# Patient Record
Sex: FEMALE | Race: WHITE | NOT HISPANIC OR LATINO | Employment: OTHER | URBAN - METROPOLITAN AREA
[De-identification: names, ages, dates, MRNs, and addresses within clinical notes are randomized per-mention and may not be internally consistent; named-entity substitution may affect disease eponyms.]

---

## 2017-04-05 ENCOUNTER — TELEPHONE (OUTPATIENT)
Dept: FAMILY MEDICINE | Facility: CLINIC | Age: 57
End: 2017-04-05

## 2017-04-05 NOTE — TELEPHONE ENCOUNTER
Panel Management Review      Patient has the following on her problem list: None      Composite cancer screening  Chart review shows that this patient is due/due soon for the following Pap Smear, Mammogram and Colonoscopy  Summary:    Patient is due/failing the following:   COLONOSCOPY, MAMMOGRAM and PAP    Action needed:   Patient needs office visit for a physical with fasting labs.    Type of outreach:    Sent letter.    Questions for provider review:    None                                                                                                                                    Tammie Diaz LPN       Chart routed to Care Team .

## 2017-04-05 NOTE — LETTER
[facility]     [address] [place]  [address]    Phone:  [phone]          Dilcia Wen                                                                [address] [facility] [address]              April 5, 2017             Our records indicate that you have not scheduled for a(n)mammogram, colonoscopy and annual female exam which was recommended by your health care team. Monitoring and managing your preventative and chronic health conditions are very important to us.     You are also due for a fasting lab appointment one week prior to the appointment with the provider.  Nothing to eat or drink, except water 10-12 hours prior to the appointment.    If you are receiving any of these services at another site please bring in a copy at your next visit so we can get it scanned into your chart.       Please call [phone] or message us through your [employer] account to schedule an appointment or provide information for your chart.     I look forward to seeing you and working with you on your health care needs.     Sincerely,       Babak Sanz MD/maurice              *If you have already scheduled an appointment, please disregard this reminder

## 2017-07-06 ENCOUNTER — TELEPHONE (OUTPATIENT)
Dept: FAMILY MEDICINE | Facility: CLINIC | Age: 57
End: 2017-07-06

## 2018-01-07 ENCOUNTER — HEALTH MAINTENANCE LETTER (OUTPATIENT)
Age: 58
End: 2018-01-07

## 2019-06-19 ENCOUNTER — OFFICE VISIT (OUTPATIENT)
Dept: FAMILY MEDICINE | Facility: CLINIC | Age: 59
End: 2019-06-19
Payer: COMMERCIAL

## 2019-06-19 VITALS
OXYGEN SATURATION: 97 % | DIASTOLIC BLOOD PRESSURE: 75 MMHG | HEART RATE: 93 BPM | BODY MASS INDEX: 23.89 KG/M2 | SYSTOLIC BLOOD PRESSURE: 123 MMHG | TEMPERATURE: 98.4 F | WEIGHT: 130.6 LBS | RESPIRATION RATE: 14 BRPM

## 2019-06-19 DIAGNOSIS — Z13.29 THYROID DISORDER SCREEN: Primary | ICD-10-CM

## 2019-06-19 DIAGNOSIS — Z12.31 ENCOUNTER FOR SCREENING MAMMOGRAM FOR BREAST CANCER: ICD-10-CM

## 2019-06-19 DIAGNOSIS — Z91.89 HISTORY OF WEIGHT CHANGE: ICD-10-CM

## 2019-06-19 DIAGNOSIS — L85.3 DRY SKIN: ICD-10-CM

## 2019-06-19 LAB
ALBUMIN SERPL-MCNC: 4.5 G/DL (ref 3.4–5)
ALP SERPL-CCNC: 66 U/L (ref 40–150)
ALT SERPL W P-5'-P-CCNC: 25 U/L (ref 0–50)
ANION GAP SERPL CALCULATED.3IONS-SCNC: 8 MMOL/L (ref 3–14)
AST SERPL W P-5'-P-CCNC: 17 U/L (ref 0–45)
BILIRUB SERPL-MCNC: 0.8 MG/DL (ref 0.2–1.3)
BUN SERPL-MCNC: 10 MG/DL (ref 7–30)
CALCIUM SERPL-MCNC: 8.2 MG/DL (ref 8.5–10.1)
CHLORIDE SERPL-SCNC: 100 MMOL/L (ref 94–109)
CO2 SERPL-SCNC: 26 MMOL/L (ref 20–32)
CREAT SERPL-MCNC: 0.8 MG/DL (ref 0.52–1.04)
GFR SERPL CREATININE-BSD FRML MDRD: 81 ML/MIN/{1.73_M2}
GLUCOSE SERPL-MCNC: 89 MG/DL (ref 70–99)
POTASSIUM SERPL-SCNC: 4.2 MMOL/L (ref 3.4–5.3)
PROT SERPL-MCNC: 7.1 G/DL (ref 6.8–8.8)
SODIUM SERPL-SCNC: 134 MMOL/L (ref 133–144)
TSH SERPL DL<=0.005 MIU/L-ACNC: 3.3 MU/L (ref 0.4–4)

## 2019-06-19 PROCEDURE — 80053 COMPREHEN METABOLIC PANEL: CPT | Performed by: PHYSICIAN ASSISTANT

## 2019-06-19 PROCEDURE — 99213 OFFICE O/P EST LOW 20 MIN: CPT | Performed by: PHYSICIAN ASSISTANT

## 2019-06-19 PROCEDURE — 84443 ASSAY THYROID STIM HORMONE: CPT | Performed by: PHYSICIAN ASSISTANT

## 2019-06-19 PROCEDURE — 36415 COLL VENOUS BLD VENIPUNCTURE: CPT | Performed by: PHYSICIAN ASSISTANT

## 2019-06-19 NOTE — PROGRESS NOTES
Subjective     Dilcia Carver is a 58 year old female who presents to clinic today for the following health issues:    HPI   Pt here to discuss possible thyroid issues - has noticed dry skin, hair thinning, weight gain - doesn't change if she diets, muscle aches - maily noticeable when going up stairs. daughters with thyroid issues and encouraged her to come in as they has similar symptoms.    Noticed this gradually over the last couple years.       Patient Active Problem List   Diagnosis     Advanced directives, counseling/discussion     Past Surgical History:   Procedure Laterality Date     ORTHOPEDIC SURGERY  2012    right foot bunion       Social History     Tobacco Use     Smoking status: Never Smoker     Smokeless tobacco: Never Used   Substance Use Topics     Alcohol use: Yes     Comment: occ     History reviewed. No pertinent family history.      Current Outpatient Medications   Medication Sig Dispense Refill     COENZYME Q-10 PO        Famotidine (PEPCID PO)        Omega-3 Fatty Acids (OMEGA-3 FISH OIL PO)        No Known Allergies  BP Readings from Last 3 Encounters:   06/19/19 123/75   10/31/16 137/75   06/17/16 116/67    Wt Readings from Last 3 Encounters:   06/19/19 59.2 kg (130 lb 9.6 oz)   10/31/16 55.8 kg (123 lb)   06/17/16 54.4 kg (120 lb)         Reviewed and updated as needed this visit by Provider  Tobacco  Allergies  Meds  Problems  Med Hx  Surg Hx  Fam Hx       Review of Systems   ROS COMP: Constitutional, HEENT, cardiovascular, pulmonary, gi and gu systems are negative, except as otherwise noted.      Objective    /75   Pulse 93   Temp 98.4  F (36.9  C) (Oral)   Resp 14   Wt 59.2 kg (130 lb 9.6 oz)   SpO2 97%   BMI 23.89 kg/m    Body mass index is 23.89 kg/m .  Physical Exam   GENERAL: healthy, alert and no distress  Head: Normocephalic, atraumatic.  Eyes: Conjunctiva clear, non icteric. PERRLA.  Ears: External ears and TMs normal BL.  Nose: Septum midline, nasal mucosa  pink and moist. No discharge.  Mouth / Throat: Normal dentition.  No oral lesions. Pharynx non erythematous, tonsils without hypertrophy.  Neck: Supple, no enlarged LN, trachea midline.  Heart: S1 and S2 normal, no murmurs, clicks, gallops or rubs. Regular rate and rhythm.  Chest: Clear; no wheezes or rales.  The abdomen is soft without tenderness, guarding, mass, rebound or organomegaly. Bowel sounds are normal.        Diagnostic Test Results:  Labs reviewed in Epic  No results found for this or any previous visit (from the past 24 hour(s)).        Assessment & Plan       ICD-10-CM    1. Thyroid disorder screen Z13.29 TSH with free T4 reflex   2. History of weight change Z91.89 Comprehensive metabolic panel   3. Dry skin L85.3 Comprehensive metabolic panel   4. Encounter for screening mammogram for breast cancer Z12.31 MA SCREENING DIGITAL BILAT - Future  (s+30)   labs pending  warning signs discussed.  Follow up  Dependant on labs.     Return in about 1 month (around 7/19/2019) for As Needed.    Natanael Kamara PA-C  [facility]

## 2019-06-20 NOTE — RESULT ENCOUNTER NOTE
Ms. Carver,    All of your labs were normal for you.    Please contact the clinic if you have additional questions.  Thank you.    Sincerely,    Natanael Kamara PA-C

## 2019-08-21 ENCOUNTER — TELEPHONE (OUTPATIENT)
Dept: FAMILY MEDICINE | Facility: CLINIC | Age: 59
End: 2019-08-21

## 2019-08-21 NOTE — TELEPHONE ENCOUNTER
Noted 7/2/19 by imaging schedulers pt will call when ready and was removed from calling list. Did not place call. Comm pref is mail only. NX

## 2019-08-21 NOTE — TELEPHONE ENCOUNTER
This patient is due for routine mammogram, please contact patient to schedule.  Maricruz Dejesus,

## 2019-10-17 ENCOUNTER — TELEPHONE (OUTPATIENT)
Dept: FAMILY MEDICINE | Facility: CLINIC | Age: 59
End: 2019-10-17

## 2020-03-10 ENCOUNTER — HEALTH MAINTENANCE LETTER (OUTPATIENT)
Age: 60
End: 2020-03-10

## 2020-12-27 ENCOUNTER — HEALTH MAINTENANCE LETTER (OUTPATIENT)
Age: 60
End: 2020-12-27

## 2021-03-21 ENCOUNTER — OFFICE VISIT (OUTPATIENT)
Dept: URGENT CARE | Facility: URGENT CARE | Age: 61
End: 2021-03-21
Payer: COMMERCIAL

## 2021-03-21 VITALS
HEART RATE: 90 BPM | BODY MASS INDEX: 25.17 KG/M2 | SYSTOLIC BLOOD PRESSURE: 136 MMHG | OXYGEN SATURATION: 98 % | WEIGHT: 137.6 LBS | RESPIRATION RATE: 16 BRPM | TEMPERATURE: 99.1 F | DIASTOLIC BLOOD PRESSURE: 75 MMHG

## 2021-03-21 DIAGNOSIS — B35.0 TINEA CAPITIS: ICD-10-CM

## 2021-03-21 DIAGNOSIS — B35.4 TINEA CORPORIS: Primary | ICD-10-CM

## 2021-03-21 PROCEDURE — 99213 OFFICE O/P EST LOW 20 MIN: CPT | Performed by: FAMILY MEDICINE

## 2021-03-21 NOTE — PROGRESS NOTES
SUBJECTIVE:  Dilcia Carver is a 60 year old female who presents to the clinic today for a rash.  Onset of rash was 1 day(s) ago.   Rash is gradual onset.  Location of the rash: forehead at hairline.  Quality/symptoms of rash: itching   Symptoms are mild and rash seems to be worsening.  Previous history of a similar rash? No  Recent exposure history: none known    Associated symptoms include: nothing.    No past medical history on file.  Current Outpatient Medications   Medication Sig Dispense Refill     COENZYME Q-10 PO        Famotidine (PEPCID PO)        Omega-3 Fatty Acids (OMEGA-3 FISH OIL PO)        History   Smoking Status     Never Smoker   Smokeless Tobacco     Never Used       ROS:  Review of systems negative except as stated above.    EXAM:   /75 (BP Location: Right arm, Patient Position: Sitting, Cuff Size: Adult Regular)   Pulse 90   Temp 99.1  F (37.3  C) (Tympanic)   Resp 16   Wt 62.4 kg (137 lb 9.6 oz)   SpO2 98%   BMI 25.17 kg/m    GENERAL: alert, no acute distress.  SKIN: Rash description:    Flat pink area with irregular border approx 2cm in diameter at hairline mid  Forehead.  No vesicles and not tender.  No other head pain or tenderness.    ASSESSMENT:  Tinea capitus    PLAN:  Use OTC antifungal tid.  If no improvement in 3days start hydrocortisone cream tid.  Follow up with primary care provider if no improvement.

## 2021-03-21 NOTE — PATIENT INSTRUCTIONS
Use OTC anti fungal three times daily for 3 days.      If no improvement start hydrocortisone cream three times daily.

## 2021-04-24 ENCOUNTER — HEALTH MAINTENANCE LETTER (OUTPATIENT)
Age: 61
End: 2021-04-24

## 2021-10-09 ENCOUNTER — HEALTH MAINTENANCE LETTER (OUTPATIENT)
Age: 61
End: 2021-10-09

## 2022-03-20 ENCOUNTER — HEALTH MAINTENANCE LETTER (OUTPATIENT)
Age: 62
End: 2022-03-20

## 2022-05-16 ENCOUNTER — HEALTH MAINTENANCE LETTER (OUTPATIENT)
Age: 62
End: 2022-05-16

## 2022-09-11 ENCOUNTER — HEALTH MAINTENANCE LETTER (OUTPATIENT)
Age: 62
End: 2022-09-11

## 2023-04-15 ENCOUNTER — OFFICE VISIT (OUTPATIENT)
Dept: URGENT CARE | Facility: URGENT CARE | Age: 63
End: 2023-04-15
Payer: COMMERCIAL

## 2023-04-15 VITALS
RESPIRATION RATE: 18 BRPM | WEIGHT: 130 LBS | TEMPERATURE: 97.9 F | SYSTOLIC BLOOD PRESSURE: 159 MMHG | OXYGEN SATURATION: 98 % | BODY MASS INDEX: 23.78 KG/M2 | HEART RATE: 81 BPM | DIASTOLIC BLOOD PRESSURE: 80 MMHG

## 2023-04-15 DIAGNOSIS — R25.3 MUSCLE TWITCHING: ICD-10-CM

## 2023-04-15 DIAGNOSIS — R03.0 ELEVATED BLOOD PRESSURE READING WITHOUT DIAGNOSIS OF HYPERTENSION: Primary | ICD-10-CM

## 2023-04-15 PROCEDURE — 99214 OFFICE O/P EST MOD 30 MIN: CPT | Performed by: FAMILY MEDICINE

## 2023-04-15 ASSESSMENT — PAIN SCALES - GENERAL: PAINLEVEL: NO PAIN (0)

## 2023-04-15 NOTE — PROGRESS NOTES
Assessment & Plan     Elevated blood pressure reading without diagnosis of hypertension  Muscle twitching  62-year-old female presented with headache, nausea, shaking, muscle twitching and elevated home blood pressure.  Blood pressure this morning was 190/79.  Physical examination remarkable for elevated blood pressure and anxious appearing otherwise normal.  Differentials discussed in detail including but not limited to metabolic, cardiovascular and psychological etiology.  Needs comprehensive evaluation to delineate a specific diagnosis.  Recommended to go ER for further evaluation and management.  Patient, spouse understood and in agreement with above plan.  All questions answered.      Tyrel Acevedo MD  [facility]   Lyudmila is a 62 year old, presenting for the following health issues:  Hypertension (Since yesterday, happens off and on, after eating and resting bp gets better, muscles are twitching, headache, nausea )         View : No data to display.              HPI     Concern -  Onset: On and off for quite some time  Description: nausea, headache, shaking, twitching muscles and elevated blood pressure  Intensity: moderate  Progression of Symptoms:  waxing and waning  Accompanying Signs & Symptoms: No chest pain, shortness of breath, diarrhea, vomiting, urinary difficulty, limb weakness or other relevant systemic symptoms      Review of Systems   Constitutional, HEENT, cardiovascular, pulmonary, GI, , musculoskeletal, neuro, skin, endocrine and psych systems are negative, except as otherwise noted.      Objective    BP (!) 159/80   Pulse 81   Temp 97.9  F (36.6  C) (Tympanic)   Resp 18   Wt 59 kg (130 lb)   SpO2 98%   BMI 23.78 kg/m    Body mass index is 23.78 kg/m .  Physical Exam   GENERAL: alert and no distress  EYES: Eyes grossly normal to inspection, PERRL and conjunctivae and sclerae normal  HENT: normal cephalic/atraumatic, nose and mouth without  ulcers or lesions, oropharynx clear and oral mucous membranes moist  NECK: no adenopathy, no asymmetry, masses, or scars and thyroid normal to palpation  RESP: lungs clear to auscultation - no rales, rhonchi or wheezes  CV: regular rates and rhythm, normal S1 S2, no S3 or S4 and no murmur, click or rub  ABDOMEN: soft, nontender  MS: no gross musculoskeletal defects noted, no edema  SKIN: no suspicious lesions or rashes  NEURO: Normal strength and tone, mentation intact and speech normal  PSYCH: mentation appears normal, tearful, judgement and insight intact and appearance well groomed

## 2023-04-27 ENCOUNTER — OFFICE VISIT (OUTPATIENT)
Dept: FAMILY MEDICINE | Facility: CLINIC | Age: 63
End: 2023-04-27
Payer: COMMERCIAL

## 2023-04-27 VITALS
SYSTOLIC BLOOD PRESSURE: 120 MMHG | TEMPERATURE: 98.1 F | OXYGEN SATURATION: 98 % | BODY MASS INDEX: 23.5 KG/M2 | WEIGHT: 132.6 LBS | HEART RATE: 76 BPM | RESPIRATION RATE: 16 BRPM | HEIGHT: 63 IN | DIASTOLIC BLOOD PRESSURE: 74 MMHG

## 2023-04-27 DIAGNOSIS — E87.1 HYPONATREMIA: ICD-10-CM

## 2023-04-27 DIAGNOSIS — G44.209 TENSION HEADACHE: Primary | ICD-10-CM

## 2023-04-27 LAB
ANION GAP SERPL CALCULATED.3IONS-SCNC: 4 MMOL/L (ref 3–14)
BUN SERPL-MCNC: 13 MG/DL (ref 7–30)
CALCIUM SERPL-MCNC: 8.3 MG/DL (ref 8.5–10.1)
CHLORIDE BLD-SCNC: 107 MMOL/L (ref 94–109)
CO2 SERPL-SCNC: 29 MMOL/L (ref 20–32)
CREAT SERPL-MCNC: 0.72 MG/DL (ref 0.52–1.04)
GFR SERPL CREATININE-BSD FRML MDRD: >90 ML/MIN/1.73M2
GLUCOSE BLD-MCNC: 104 MG/DL (ref 70–99)
POTASSIUM BLD-SCNC: 4.6 MMOL/L (ref 3.4–5.3)
SODIUM SERPL-SCNC: 140 MMOL/L (ref 133–144)

## 2023-04-27 PROCEDURE — 80048 BASIC METABOLIC PNL TOTAL CA: CPT | Performed by: NURSE PRACTITIONER

## 2023-04-27 PROCEDURE — 36415 COLL VENOUS BLD VENIPUNCTURE: CPT | Performed by: NURSE PRACTITIONER

## 2023-04-27 PROCEDURE — 99213 OFFICE O/P EST LOW 20 MIN: CPT | Performed by: NURSE PRACTITIONER

## 2023-04-27 RX ORDER — SUMATRIPTAN 25 MG/1
25 TABLET, FILM COATED ORAL
Qty: 20 TABLET | Refills: 1 | Status: SHIPPED | OUTPATIENT
Start: 2023-04-27

## 2023-04-27 ASSESSMENT — ENCOUNTER SYMPTOMS
NERVOUS/ANXIOUS: 0
HEADACHES: 1
CHEST TIGHTNESS: 0
ABDOMINAL PAIN: 0
COUGH: 0
ARTHRALGIAS: 0
RHINORRHEA: 0
WHEEZING: 0
SHORTNESS OF BREATH: 0
PALPITATIONS: 0
NAUSEA: 1
DIZZINESS: 1
CONSTIPATION: 0
NUMBNESS: 0
MYALGIAS: 0
FATIGUE: 0
DIARRHEA: 0
DYSPHORIC MOOD: 0
SLEEP DISTURBANCE: 0
VOMITING: 0
SORE THROAT: 0
LIGHT-HEADEDNESS: 0
ABDOMINAL DISTENTION: 0

## 2023-04-27 ASSESSMENT — PAIN SCALES - GENERAL: PAINLEVEL: NO PAIN (0)

## 2023-04-27 NOTE — PATIENT INSTRUCTIONS
Check your blood pressure when you first start to feel the pressure in your eyes.     Plan to check blood pressure out in the community and notify if is consistently (50% of time) above 130/90.      Please schedule a preventative visit.

## 2023-04-27 NOTE — PROGRESS NOTES
Assessment & Plan     Tension headache  Previous emergency department notes and labs reviewed.  Encouraged to check blood pressure when first starts to feel pressure in head.  If blood pressure is more elevated at that time consider propanolol for abortive treatment.  Prescription given today for Imitrex.  Educated on use and possible side effects.  Education given regarding warning signs to watch for and when would need to seek immediate medical attention.  - SUMAtriptan (IMITREX) 25 MG tablet; Take 1 tablet (25 mg) by mouth at onset of headache for migraine May repeat in 2 hours. Max 8 tablets/24 hours.    Hyponatremia  Previous ED labs reviewed.  We will recheck here today.  - Basic metabolic panel; Future    Encouraged to schedule preventative visit.  Plans to schedule through [facility].    Review of external notes as documented elsewhere in note  Review of the result(s) of each unique test - Labs  Ordering of each unique test  Prescription drug management  25 minutes spent by me on the date of the encounter doing chart review, history and exam, documentation and further activities per the note     MED REC REQUIRED  Post Medication Reconciliation Status: discharge medications reconciled and changed, per note/orders      LOGAN Mendez CNP  [facility] CASANDRA Coreas is a 62 year old, presenting for the following health issues:  ER F/U        4/27/2023     9:02 AM   Additional Questions   Roomed by Carter HAM   Accompanied by DEYSI         4/27/2023     9:02 AM   Patient Reported Additional Medications   Patient reports taking the following new medications magnesium VitB, turmeric     HPI     ED/UC Followup:    Facility:  [facility] ER  Date of visit: 04/15/21  Reason for visit: Severe headache  Current Status: Improved      Thurs headache and then headache finally went away on Tues. Used to get headaches 1-2 per year. Then menopause and they stopped. Headaches start in  front, goes behind eyes and then to back of head. Oct, Nov, Dec and 2 in April. Will be very nausated with headaches. Feels like a lava lamp behind eyes. Takes dramamine for nausea. Is very sensitive to smells. Feels like there is someone inside banging on head. Tylenol or iburpfem do not dull it all. ASA does dull it a bit. Did hear a low frequency humm in head. No previous head trauma or concussions. Can tell when they are going to start. Prior to getting COVID will feel dizzy then they would come. Now after COVID gets pressure behind the eyes and more motion sickness. They are lasting longer and they are coming more frequently. Does have a blood pressure cuff at home and blood pressure was 190/88. Just checks blood pressure when has a headache. Will take ASA when first starts feeling it come on and sometimes that helps to prevent it from getting bad. Has been under more stress. Has been about the same as always. Drinks a good amount of water per day. Does drink caffeine.    Feels like mouth is burning and will not go away. If eats something too hot will be worse or if eats spicy food will get worse.        Review of Systems   Constitutional: Negative for fatigue.   HENT: Negative for ear pain, rhinorrhea and sore throat.         Sensitivity to smell with headaches   Eyes: Negative for visual disturbance.        Pressure behind eyes and vision changes with headaches   Respiratory: Negative for cough, chest tightness, shortness of breath and wheezing.    Cardiovascular: Negative for chest pain and palpitations.   Gastrointestinal: Positive for nausea (with headaches). Negative for abdominal distention, abdominal pain, constipation, diarrhea and vomiting.   Endocrine: Negative for cold intolerance and heat intolerance.   Musculoskeletal: Negative for arthralgias and myalgias.   Skin: Negative for rash.   Neurological: Positive for dizziness (with headaches) and headaches. Negative for light-headedness and numbness.  "  Psychiatric/Behavioral: Negative for dysphoric mood and sleep disturbance. The patient is not nervous/anxious.           Objective    /74   Pulse 76   Temp 98.1  F (36.7  C) (Temporal)   Resp 16   Ht 1.59 m (5' 2.6\")   Wt 60.1 kg (132 lb 9.6 oz)   SpO2 98%   BMI 23.79 kg/m    Body mass index is 23.79 kg/m .  Physical Exam  Constitutional:       Appearance: Normal appearance. She is well-developed.   HENT:      Head: Normocephalic and atraumatic.      Right Ear: Tympanic membrane and external ear normal. No middle ear effusion.      Left Ear: Tympanic membrane and external ear normal.  No middle ear effusion.      Nose: No mucosal edema.   Eyes:      Extraocular Movements: Extraocular movements intact.      Pupils: Pupils are equal, round, and reactive to light.   Neck:      Thyroid: No thyromegaly.      Vascular: No carotid bruit.   Cardiovascular:      Rate and Rhythm: Normal rate and regular rhythm.      Heart sounds: Normal heart sounds.   Pulmonary:      Effort: Pulmonary effort is normal.      Breath sounds: Normal breath sounds.   Abdominal:      General: Bowel sounds are normal.      Palpations: Abdomen is soft.   Skin:     General: Skin is warm and dry.   Neurological:      Mental Status: She is alert.      Motor: Motor function is intact.      Coordination: Coordination is intact.   Psychiatric:         Behavior: Behavior normal.                  "

## 2023-06-03 ENCOUNTER — HEALTH MAINTENANCE LETTER (OUTPATIENT)
Age: 63
End: 2023-06-03

## 2023-06-08 ENCOUNTER — TRANSFERRED RECORDS (OUTPATIENT)
Dept: HEALTH INFORMATION MANAGEMENT | Facility: CLINIC | Age: 63
End: 2023-06-08
Payer: COMMERCIAL

## 2023-06-21 ENCOUNTER — TRANSFERRED RECORDS (OUTPATIENT)
Dept: HEALTH INFORMATION MANAGEMENT | Facility: CLINIC | Age: 63
End: 2023-06-21
Payer: COMMERCIAL

## 2023-06-22 ENCOUNTER — TRANSFERRED RECORDS (OUTPATIENT)
Dept: HEALTH INFORMATION MANAGEMENT | Facility: CLINIC | Age: 63
End: 2023-06-22
Payer: COMMERCIAL

## 2023-07-13 ENCOUNTER — PATIENT OUTREACH (OUTPATIENT)
Dept: FAMILY MEDICINE | Facility: CLINIC | Age: 63
End: 2023-07-13
Payer: COMMERCIAL

## 2023-07-13 NOTE — TELEPHONE ENCOUNTER
Patient Quality Outreach    Patient is due for the following:   Colon Cancer Screening  Breast Cancer Screening - Mammogram  Cervical Cancer Screening - PAP Needed  Physical Preventive Adult Physical    Next Steps:   Schedule a Adult Preventative    Type of outreach:    Sent [employer] message.      Questions for provider review:    None           Tanika Hernandez, FATOUMATA  Chart routed to Care Team.

## 2024-05-04 ENCOUNTER — HEALTH MAINTENANCE LETTER (OUTPATIENT)
Age: 64
End: 2024-05-04

## 2024-07-13 ENCOUNTER — HEALTH MAINTENANCE LETTER (OUTPATIENT)
Age: 64
End: 2024-07-13

## 2024-12-30 ENCOUNTER — OFFICE VISIT (OUTPATIENT)
Dept: FAMILY MEDICINE | Facility: CLINIC | Age: 64
End: 2024-12-30
Payer: COMMERCIAL

## 2024-12-30 VITALS
DIASTOLIC BLOOD PRESSURE: 83 MMHG | OXYGEN SATURATION: 98 % | HEIGHT: 62 IN | HEART RATE: 72 BPM | SYSTOLIC BLOOD PRESSURE: 137 MMHG | BODY MASS INDEX: 25.03 KG/M2 | RESPIRATION RATE: 16 BRPM | WEIGHT: 136 LBS | TEMPERATURE: 98.6 F

## 2024-12-30 DIAGNOSIS — M79.671 PAIN OF RIGHT HEEL: Primary | ICD-10-CM

## 2024-12-30 DIAGNOSIS — Z12.11 SCREEN FOR COLON CANCER: ICD-10-CM

## 2024-12-30 DIAGNOSIS — Z12.31 VISIT FOR SCREENING MAMMOGRAM: ICD-10-CM

## 2024-12-30 PROCEDURE — G2211 COMPLEX E/M VISIT ADD ON: HCPCS | Performed by: PHYSICIAN ASSISTANT

## 2024-12-30 PROCEDURE — 99213 OFFICE O/P EST LOW 20 MIN: CPT | Performed by: PHYSICIAN ASSISTANT

## 2024-12-30 ASSESSMENT — PAIN SCALES - GENERAL: PAINLEVEL_OUTOF10: NO PAIN (0)

## 2024-12-30 NOTE — PROGRESS NOTES
"  Assessment & Plan     Pain of right heel  Likely overuse, suspect tendonitis of achilles tendon.  Pt believes she had plantar fasciitis previously which may have changed her gait.  Walks 2+ miles on a walk with her dog.  I encouraged some rest, gentle daily stretching. May also gently roll heel on ball as this does still hurt and may have unresolved plantar fascia inflammation  Ice after use for pain control.  Ok to use OTC for pain control as needed     Visit for screening mammogram  Ordered   - MA Screening Bilateral w/ John; Future    Screen for colon cancer  Ordered   - Colonoscopy Screening  Referral; Future      The longitudinal plan of care for the diagnosis(es)/condition(s) as documented were addressed during this visit. Due to the added complexity in care, I will continue to support Lyudmila in the subsequent management and with ongoing continuity of care.        Follow up if not improving in 1-2 months or sooner if worsened     Subjective   Lyudmila is a 64 year old, presenting for the following health issues:  Heel pain        12/30/2024    10:04 AM   Additional Questions   Roomed by Umm Salomon MA   Accompanied by Self     History of Present Illness       Reason for visit:  Bumps on feet  Symptom onset:  More than a month   She is taking medications regularly.           Objective    /83   Pulse 72   Temp 98.6  F (37  C) (Tympanic)   Resp 16   Ht 1.575 m (5' 2\")   Wt 61.7 kg (136 lb)   SpO2 98%   Breastfeeding No   BMI 24.87 kg/m    Body mass index is 24.87 kg/m .      Physical Exam   GENERAL: alert and no distress  MS: no gross musculoskeletal defects noted, no edema. ROM intact, strength normal. R heel with some tenderness with direct palpation although minimal. Achilles tendon intact without significant pain on exam   SKIN: no suspicious lesions or rashes  NEURO: Normal strength and tone, mentation intact and speech normal        Signed Electronically by: Thad Henning PA-C      "

## 2025-02-03 ENCOUNTER — TELEPHONE (OUTPATIENT)
Dept: FAMILY MEDICINE | Facility: CLINIC | Age: 65
End: 2025-02-03
Payer: COMMERCIAL

## 2025-02-03 NOTE — TELEPHONE ENCOUNTER
Patient Quality Outreach    Patient is due for the following:   Cervical Cancer Screening - PAP Needed    Action(s) Taken:   Patient has upcoming appointment, these items will be addressed at that time.    Type of outreach:    Chart review performed, no outreach needed.    Questions for provider review:    None           Umm Salomon MA

## 2025-05-23 ENCOUNTER — HOSPITAL ENCOUNTER (OUTPATIENT)
Facility: AMBULATORY SURGERY CENTER | Age: 65
End: 2025-05-23
Attending: INTERNAL MEDICINE
Payer: COMMERCIAL

## 2025-06-02 RX ORDER — NALOXONE HYDROCHLORIDE 0.4 MG/ML
0.4 INJECTION, SOLUTION INTRAMUSCULAR; INTRAVENOUS; SUBCUTANEOUS
Status: CANCELLED | OUTPATIENT
Start: 2025-06-02

## 2025-06-02 RX ORDER — PROCHLORPERAZINE MALEATE 10 MG
10 TABLET ORAL EVERY 6 HOURS PRN
Status: CANCELLED | OUTPATIENT
Start: 2025-06-02

## 2025-06-02 RX ORDER — NALOXONE HYDROCHLORIDE 0.4 MG/ML
0.2 INJECTION, SOLUTION INTRAMUSCULAR; INTRAVENOUS; SUBCUTANEOUS
Status: CANCELLED | OUTPATIENT
Start: 2025-06-02

## 2025-06-02 RX ORDER — LIDOCAINE 40 MG/G
CREAM TOPICAL
Status: CANCELLED | OUTPATIENT
Start: 2025-06-02

## 2025-06-02 RX ORDER — ONDANSETRON 2 MG/ML
4 INJECTION INTRAMUSCULAR; INTRAVENOUS EVERY 6 HOURS PRN
Status: CANCELLED | OUTPATIENT
Start: 2025-06-02

## 2025-06-02 RX ORDER — ONDANSETRON 2 MG/ML
4 INJECTION INTRAMUSCULAR; INTRAVENOUS
Status: CANCELLED | OUTPATIENT
Start: 2025-06-02

## 2025-06-02 RX ORDER — ONDANSETRON 4 MG/1
4 TABLET, ORALLY DISINTEGRATING ORAL EVERY 6 HOURS PRN
Status: CANCELLED | OUTPATIENT
Start: 2025-06-02

## 2025-06-02 RX ORDER — FLUMAZENIL 0.1 MG/ML
0.2 INJECTION, SOLUTION INTRAVENOUS
Status: CANCELLED | OUTPATIENT
Start: 2025-06-02 | End: 2025-06-03

## 2025-07-19 ENCOUNTER — HEALTH MAINTENANCE LETTER (OUTPATIENT)
Age: 65
End: 2025-07-19